# Patient Record
Sex: MALE | Race: BLACK OR AFRICAN AMERICAN | NOT HISPANIC OR LATINO | Employment: UNEMPLOYED | URBAN - METROPOLITAN AREA
[De-identification: names, ages, dates, MRNs, and addresses within clinical notes are randomized per-mention and may not be internally consistent; named-entity substitution may affect disease eponyms.]

---

## 2017-04-21 ENCOUNTER — HOSPITAL ENCOUNTER (EMERGENCY)
Facility: HOSPITAL | Age: 37
Discharge: ANOTHER HOSPITAL | End: 2017-04-21
Attending: EMERGENCY MEDICINE
Payer: MEDICAID

## 2017-04-21 VITALS
OXYGEN SATURATION: 98 % | RESPIRATION RATE: 20 BRPM | HEART RATE: 90 BPM | SYSTOLIC BLOOD PRESSURE: 134 MMHG | WEIGHT: 152 LBS | TEMPERATURE: 99 F | HEIGHT: 71 IN | BODY MASS INDEX: 21.28 KG/M2 | DIASTOLIC BLOOD PRESSURE: 70 MMHG

## 2017-04-21 DIAGNOSIS — S02.601B OPEN FRACTURE OF RIGHT SIDE OF MANDIBULAR BODY, INITIAL ENCOUNTER: Primary | ICD-10-CM

## 2017-04-21 DIAGNOSIS — R93.89 ABNORMAL X-RAY: ICD-10-CM
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
## 2017-04-21 PROCEDURE — 99285 EMERGENCY DEPT VISIT HI MDM: CPT | Mod: 25

## 2017-04-21 PROCEDURE — 25000003 PHARM REV CODE 250: Performed by: EMERGENCY MEDICINE

## 2017-04-21 PROCEDURE — 96365 THER/PROPH/DIAG IV INF INIT: CPT

## 2017-04-21 RX ORDER — CLINDAMYCIN PHOSPHATE 900 MG/50ML
900 INJECTION, SOLUTION INTRAVENOUS
Status: COMPLETED | OUTPATIENT
Start: 2017-04-21 | End: 2017-04-21

## 2017-04-21 RX ADMIN — CLINDAMYCIN IN 5 PERCENT DEXTROSE 900 MG: 18 INJECTION, SOLUTION INTRAVENOUS at 01:04
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
## 2018-01-12 ENCOUNTER — HOSPITAL ENCOUNTER (EMERGENCY)
Facility: HOSPITAL | Age: 38
Discharge: HOME | End: 2018-01-12
Attending: EMERGENCY MEDICINE
Payer: MEDICAID

## 2018-01-12 VITALS
OXYGEN SATURATION: 95 % | HEIGHT: 71 IN | HEART RATE: 65 BPM | RESPIRATION RATE: 16 BRPM | BODY MASS INDEX: 23.1 KG/M2 | WEIGHT: 165 LBS | TEMPERATURE: 99 F | SYSTOLIC BLOOD PRESSURE: 143 MMHG | DIASTOLIC BLOOD PRESSURE: 97 MMHG

## 2018-01-12 DIAGNOSIS — K40.90 RIGHT INGUINAL HERNIA: Primary | ICD-10-CM

## 2018-01-12 PROCEDURE — 99284 EMERGENCY DEPT VISIT MOD MDM: CPT

## 2018-01-12 PROCEDURE — 25000003 PHARM REV CODE 250: Performed by: NURSE PRACTITIONER

## 2018-01-12 RX ORDER — HYDROCODONE BITARTRATE AND ACETAMINOPHEN 5; 325 MG/1; MG/1
1 TABLET ORAL
Status: COMPLETED | OUTPATIENT
Start: 2018-01-12 | End: 2018-01-12

## 2018-01-12 RX ADMIN — HYDROCODONE BITARTRATE AND ACETAMINOPHEN 1 TABLET: 5; 325 TABLET ORAL at 06:01

## 2018-01-13 NOTE — ED PROVIDER NOTES
"Encounter Date: 1/12/2018       History     Chief Complaint   Patient presents with    Abdominal Pain     "I have a hernia. It popped out last night and went to the hospital. They pushed it back in and told me to come back if it happened again."      Chief complaint: Abdominal pain    History of present illness: Patient is a 37-year-old male who reports a right inguinal hernia for which she was seen last night at [facility].  He states he was given a day for surgery at the end of the month and tramadol for pain control which she has not yet filled.  He reports present for 6 months and is intermittently coming out has as of now still been reducible.  He reports that increased pressure causes discomfort.  He reports the pain is 10 over 10.  He denies abdominal pain or difficulty urinating.      The history is provided by the patient and a friend. No  was used.     Review of patient's allergies indicates:  No Known Allergies  History reviewed. No pertinent past medical history.  History reviewed. No pertinent surgical history.  History reviewed. No pertinent family history.  Social History   Substance Use Topics    Smoking status: Current Every Day Smoker     Types: Cigarettes    Smokeless tobacco: Never Used    Alcohol use 2.4 oz/week     4 Cans of beer per week     Review of Systems   Constitutional: Negative for appetite change, chills, diaphoresis, fatigue and fever.   HENT: Negative for congestion, ear discharge, ear pain, postnasal drip, rhinorrhea, sinus pressure, sneezing, sore throat and voice change.    Eyes: Negative for discharge, itching and visual disturbance.   Respiratory: Negative for cough, shortness of breath and wheezing.    Cardiovascular: Negative for chest pain, palpitations and leg swelling.   Gastrointestinal: Negative for abdominal pain, nausea and vomiting.   Endocrine: Negative for polydipsia, polyphagia and polyuria.   Genitourinary: Negative for difficulty " urinating, discharge, dysuria, frequency, hematuria, penile pain, penile swelling and urgency.        (+) Right groin hernia   Musculoskeletal: Negative for arthralgias and myalgias.   Skin: Negative for rash and wound.   Neurological: Negative for dizziness, seizures, syncope and weakness.   Hematological: Negative for adenopathy. Does not bruise/bleed easily.   Psychiatric/Behavioral: Negative for agitation and self-injury. The patient is not nervous/anxious.        Physical Exam     Initial Vitals [01/12/18 1659]   BP Pulse Resp Temp SpO2   (!) 145/73 (!) 58 16 98.1 °F (36.7 °C) 100 %      MAP       97         Physical Exam    Nursing note and vitals reviewed.  Constitutional: He appears well-developed and well-nourished. He is not diaphoretic. No distress.   HENT:   Head: Normocephalic and atraumatic.   Right Ear: External ear normal.   Left Ear: External ear normal.   Nose: Nose normal.   Eyes: Pupils are equal, round, and reactive to light. Right eye exhibits no discharge. Left eye exhibits no discharge. No scleral icterus.   Neck: Normal range of motion.   Pulmonary/Chest: No respiratory distress.   Abdominal: He exhibits no distension. A hernia is present. Hernia confirmed positive in the right inguinal area.   Musculoskeletal: Normal range of motion.   Neurological: He is alert and oriented to person, place, and time.   Skin: Skin is dry. Capillary refill takes less than 2 seconds.         ED Course   Procedures  Labs Reviewed - No data to display          Medical Decision Making:   Physical Exam shows a non-toxic, afebrile, and well appearing male who c/o right inguinal hernia.  Examination of the area demonstrates a nonpulsatile mass of the right inguinal area that reduces easily.     Vital Signs Are Reassuring. If available, previous records reviewed.     My overall impression is right inguinal hernia. I considered, but at this time, do not suspect STD, adenopathy, left inguinal hernia.    ED Course:  norco. D/C Meds: pt should fill tramadol already prescribed. Pt should also use an inguinal supporter. The diagnosis, treatment plan, instructions for follow-up and reevaluation with general surgery as well as ED return precautions were discussed and understanding was verbalized. All questions or concerns have been addressed.     This case was discussed with Dr. Newby who is in agreement with my assessment and plan.                      ED Course      Clinical Impression:   The encounter diagnosis was Right inguinal hernia.    Disposition:   Disposition: Discharged  Condition: Stable                        Lm Fagan DNP  01/12/18 1913

## 2018-01-13 NOTE — DISCHARGE INSTRUCTIONS
Fill and take tramadol as ordered for pain. Go to medical supply store for inguinal hernia support and wear it.  If hernia becomes unable to be reduced return to emergency department.

## 2019-10-15 PROCEDURE — 99284 EMERGENCY DEPT VISIT MOD MDM: CPT | Mod: 25

## 2019-10-15 PROCEDURE — 96372 THER/PROPH/DIAG INJ SC/IM: CPT

## 2019-10-15 PROCEDURE — 12006 RPR S/N/A/GEN/TRK20.1-30.0CM: CPT

## 2019-10-16 ENCOUNTER — HOSPITAL ENCOUNTER (EMERGENCY)
Facility: HOSPITAL | Age: 39
Discharge: HOME | End: 2019-10-16
Attending: EMERGENCY MEDICINE

## 2019-10-16 VITALS
HEIGHT: 71 IN | BODY MASS INDEX: 23.1 KG/M2 | WEIGHT: 165 LBS | OXYGEN SATURATION: 99 % | SYSTOLIC BLOOD PRESSURE: 132 MMHG | TEMPERATURE: 99 F | DIASTOLIC BLOOD PRESSURE: 80 MMHG | HEART RATE: 82 BPM | RESPIRATION RATE: 20 BRPM

## 2019-10-16 DIAGNOSIS — S41.119A: Primary | ICD-10-CM

## 2019-10-16 PROCEDURE — 90715 TDAP VACCINE 7 YRS/> IM: CPT | Mod: SL | Performed by: PHYSICIAN ASSISTANT

## 2019-10-16 PROCEDURE — 90471 IMMUNIZATION ADMIN: CPT | Mod: VFC | Performed by: PHYSICIAN ASSISTANT

## 2019-10-16 PROCEDURE — 63600175 PHARM REV CODE 636 W HCPCS: Performed by: PHYSICIAN ASSISTANT

## 2019-10-16 PROCEDURE — 25000003 PHARM REV CODE 250: Performed by: PHYSICIAN ASSISTANT

## 2019-10-16 RX ORDER — CEFAZOLIN SODIUM 1 G/3ML
1 INJECTION, POWDER, FOR SOLUTION INTRAMUSCULAR; INTRAVENOUS
Status: COMPLETED | OUTPATIENT
Start: 2019-10-16 | End: 2019-10-16

## 2019-10-16 RX ORDER — OXYCODONE AND ACETAMINOPHEN 5; 325 MG/1; MG/1
1 TABLET ORAL EVERY 6 HOURS PRN
Qty: 8 TABLET | Refills: 0 | Status: SHIPPED | OUTPATIENT
Start: 2019-10-16

## 2019-10-16 RX ORDER — CEPHALEXIN 500 MG/1
500 CAPSULE ORAL 4 TIMES DAILY
Qty: 28 CAPSULE | Refills: 0 | Status: SHIPPED | OUTPATIENT
Start: 2019-10-16 | End: 2019-10-23

## 2019-10-16 RX ORDER — BACITRACIN ZINC 500 UNIT/G
OINTMENT (GRAM) TOPICAL 2 TIMES DAILY
Qty: 14 G | Refills: 0 | Status: SHIPPED | OUTPATIENT
Start: 2019-10-16

## 2019-10-16 RX ORDER — IBUPROFEN 600 MG/1
600 TABLET ORAL EVERY 6 HOURS PRN
Qty: 20 TABLET | Refills: 0 | Status: SHIPPED | OUTPATIENT
Start: 2019-10-16

## 2019-10-16 RX ORDER — LIDOCAINE HYDROCHLORIDE 10 MG/ML
10 INJECTION INFILTRATION; PERINEURAL
Status: COMPLETED | OUTPATIENT
Start: 2019-10-16 | End: 2019-10-16

## 2019-10-16 RX ADMIN — CEFAZOLIN 1 G: 1 INJECTION, POWDER, FOR SOLUTION INTRAVENOUS at 01:10

## 2019-10-16 RX ADMIN — LIDOCAINE HYDROCHLORIDE 10 ML: 10 INJECTION, SOLUTION INFILTRATION; PERINEURAL at 01:10

## 2019-10-16 RX ADMIN — CLOSTRIDIUM TETANI TOXOID ANTIGEN (FORMALDEHYDE INACTIVATED), CORYNEBACTERIUM DIPHTHERIAE TOXOID ANTIGEN (FORMALDEHYDE INACTIVATED), BORDETELLA PERTUSSIS TOXOID ANTIGEN (GLUTARALDEHYDE INACTIVATED), BORDETELLA PERTUSSIS FILAMENTOUS HEMAGGLUTININ ANTIGEN (FORMALDEHYDE INACTIVATED), BORDETELLA PERTUSSIS PERTACTIN ANTIGEN, AND BORDETELLA PERTUSSIS FIMBRIAE 2/3 ANTIGEN 0.5 ML: 5; 2; 2.5; 5; 3; 5 INJECTION, SUSPENSION INTRAMUSCULAR at 12:10

## 2019-10-16 RX ADMIN — BACITRACIN, NEOMYCIN, POLYMYXIN B 1 EACH: 400; 3.5; 5 OINTMENT TOPICAL at 01:10

## 2019-10-16 NOTE — DISCHARGE INSTRUCTIONS
Keep dressing in place for 24hrs. After that, change dressings twice daily; apply antibiotic ointment with each dressing change. Keep wounds covered. If any breakthrough bleeding, apply pressure, elevate arm. Take all antibiotics as prescribed; try to take with meals to limit nausea. Take entire course of antibiotics. Sutures need to be removed in 12-14 days; return to this ED for suture removal if you are unable to follow-up with a primary care provider.     Follow-up and establish care with a primary care provider for wound reevaluation. Immediately return to this ED if wounds become red and warm, if wounds begin to drain foul-smelling fluid, if you begin with fever or worsening pain, if any other problems occur.

## 2019-10-16 NOTE — ED NOTES
Pt resting calm in bed no active bleeding noted, no complaints of loss of sensation to wounds with dressings applied
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
## 2019-10-16 NOTE — ED NOTES
All wounds dressed and sutured with neomycin to affected areas with dressings and ace wraps applied to keep dressings intact

## 2021-11-16 ENCOUNTER — LAB VISIT (OUTPATIENT)
Dept: PRIMARY CARE CLINIC | Facility: OTHER | Age: 41
End: 2021-11-16
Payer: OTHER GOVERNMENT

## 2021-11-16 DIAGNOSIS — Z20.822 ENCOUNTER FOR LABORATORY TESTING FOR COVID-19 VIRUS: ICD-10-CM

## 2021-11-16 PROCEDURE — U0003 INFECTIOUS AGENT DETECTION BY NUCLEIC ACID (DNA OR RNA); SEVERE ACUTE RESPIRATORY SYNDROME CORONAVIRUS 2 (SARS-COV-2) (CORONAVIRUS DISEASE [COVID-19]), AMPLIFIED PROBE TECHNIQUE, MAKING USE OF HIGH THROUGHPUT TECHNOLOGIES AS DESCRIBED BY CMS-2020-01-R: HCPCS | Performed by: INTERNAL MEDICINE

## 2021-11-17 LAB
SARS-COV-2 RNA RESP QL NAA+PROBE: NOT DETECTED
SARS-COV-2- CYCLE NUMBER: NORMAL

## 2022-03-29 ENCOUNTER — LAB VISIT (OUTPATIENT)
Dept: PRIMARY CARE CLINIC | Facility: OTHER | Age: 42
End: 2022-03-29
Payer: MEDICAID

## 2022-03-29 DIAGNOSIS — Z20.822 ENCOUNTER FOR LABORATORY TESTING FOR COVID-19 VIRUS: ICD-10-CM

## 2022-03-29 PROCEDURE — U0003 INFECTIOUS AGENT DETECTION BY NUCLEIC ACID (DNA OR RNA); SEVERE ACUTE RESPIRATORY SYNDROME CORONAVIRUS 2 (SARS-COV-2) (CORONAVIRUS DISEASE [COVID-19]), AMPLIFIED PROBE TECHNIQUE, MAKING USE OF HIGH THROUGHPUT TECHNOLOGIES AS DESCRIBED BY CMS-2020-01-R: HCPCS | Performed by: INTERNAL MEDICINE

## 2022-03-30 LAB
SARS-COV-2 RNA RESP QL NAA+PROBE: NOT DETECTED
SARS-COV-2- CYCLE NUMBER: NORMAL

## 2022-07-14 ENCOUNTER — HOSPITAL ENCOUNTER (EMERGENCY)
Facility: HOSPITAL | Age: 42
Discharge: HOME | End: 2022-07-14
Attending: STUDENT IN AN ORGANIZED HEALTH CARE EDUCATION/TRAINING PROGRAM
Payer: MEDICAID

## 2022-07-14 VITALS
SYSTOLIC BLOOD PRESSURE: 121 MMHG | OXYGEN SATURATION: 99 % | DIASTOLIC BLOOD PRESSURE: 78 MMHG | WEIGHT: 165 LBS | BODY MASS INDEX: 23.1 KG/M2 | HEART RATE: 91 BPM | HEIGHT: 71 IN | RESPIRATION RATE: 18 BRPM | TEMPERATURE: 99 F

## 2022-07-14 DIAGNOSIS — T40.601A OPIATE OVERDOSE, ACCIDENTAL OR UNINTENTIONAL, INITIAL ENCOUNTER: ICD-10-CM

## 2022-07-14 DIAGNOSIS — T50.901A ACCIDENTAL DRUG OVERDOSE, INITIAL ENCOUNTER: Primary | ICD-10-CM

## 2022-07-14 PROCEDURE — 99283 EMERGENCY DEPT VISIT LOW MDM: CPT

## 2022-07-14 RX ORDER — NALOXONE HYDROCHLORIDE 4 MG/.1ML
SPRAY NASAL
Qty: 1 EACH | Refills: 11 | Status: SHIPPED | OUTPATIENT
Start: 2022-07-14

## 2022-07-14 NOTE — ED PROVIDER NOTES
"Encounter Date: 7/14/2022    SCRIBE #1 NOTE: I, Conner Dos Santos, am scribing for, and in the presence of,  Robert Arenas MD. I have scribed the following portions of the note - Other sections scribed: HPI, ROS, PE.       History     Chief Complaint   Patient presents with    Drug Overdose     Pt here via EMS with reports of drug overdose. Pt found unresponsive with agonal resp, and pin point pupils. PD gave 4mg IN prior to EMS arrival with no response. EMS gave 2mg IV with positive response.      Jorge Sellers is a 41 y. o male with no pertinent PMHx, that comes to the ED via EMS for possible drug overdose beginning tonight. Patient was found unresponsive with agonal respiration and pinpoint pupils by EMS personnel. Patient was given 4 mg IN prior to EMS arrival, and 0.5 mg Narcan IV with positive response upon EMS arrival approximately 40 minutes ago. Patient reports he was snorting some opioids and drinking 1 beer today, endorsing "he's fine and wants to leave". No other medications taken PTA. No alleviating or exacerbating factors noted. Denies CP, SOB, abdominal pain, N/V/D, or fever. Patient states this has never happened to him before. No known allergies.    The history is provided by the EMS personnel and the patient. No  was used.     Review of patient's allergies indicates:  No Known Allergies  No past medical history on file.  No past surgical history on file.  No family history on file.  Social History     Tobacco Use    Smoking status: Current Every Day Smoker     Types: Cigarettes    Smokeless tobacco: Never Used   Substance Use Topics    Alcohol use: Yes     Alcohol/week: 4.0 standard drinks     Types: 4 Cans of beer per week    Drug use: No     Review of Systems   Constitutional: Negative for chills and fever.   HENT: Negative for facial swelling and sore throat.    Eyes: Negative for visual disturbance.   Respiratory: Negative for cough and shortness of breath.  "   Cardiovascular: Negative for chest pain and palpitations.   Gastrointestinal: Negative for abdominal pain, nausea and vomiting.   Genitourinary: Negative for dysuria and hematuria.   Musculoskeletal: Negative for back pain.   Skin: Negative for rash.   Neurological: Negative for weakness and headaches.   Hematological: Does not bruise/bleed easily.   Psychiatric/Behavioral: Negative.        Physical Exam     Initial Vitals [07/14/22 0029]   BP Pulse Resp Temp SpO2   128/81 93 (!) 22 98.1 °F (36.7 °C) 99 %      MAP       --         Physical Exam    Nursing note and vitals reviewed.  Constitutional: He appears well-developed and well-nourished. He is not diaphoretic. No distress.   Steady on his feet.  Smells like EtOH.   HENT:   Head: Normocephalic and atraumatic.   Right Ear: External ear normal.   Left Ear: External ear normal.   Nose: Nose normal.   Eyes: Conjunctivae are normal. No scleral icterus.   Neck: Neck supple. No tracheal deviation present.   Cardiovascular: Normal rate, regular rhythm and normal heart sounds. Exam reveals no gallop and no friction rub.    No murmur heard.  Pulmonary/Chest: Breath sounds normal. No respiratory distress.   Abdominal: Abdomen is soft. Bowel sounds are normal. There is no abdominal tenderness.   Musculoskeletal:      Cervical back: Neck supple.     Neurological: He is alert and oriented to person, place, and time. GCS score is 15. GCS eye subscore is 4. GCS verbal subscore is 5. GCS motor subscore is 6.   Skin: Skin is warm and dry.   Psychiatric: He has a normal mood and affect. Thought content normal. His speech is slurred.         ED Course   Procedures  Labs Reviewed - No data to display       Imaging Results    None          Medications - No data to display  Medical Decision Making:   History:   Old Medical Records: I decided to obtain old medical records.          Scribe Attestation:   Scribe #1: I performed the above scribed service and the documentation accurately  describes the services I performed. I attest to the accuracy of the note.        ED Course as of 07/14/22 0215   Thu Jul 14, 2022   0046 Patient attempting to leave the emergency department.  On my evaluation patient is slurring speech, smells of alcohol..  He is alert and oriented but I do not believe the patient is capable of making a decision to leave this time.  After discussing with him my thoughts he is willing to stay for observational period and is attempting to call to get a ride. [CC]   0134 Patient received Narcan prior to arrival, 40 minutes. [CC]   0212 On re-evaluation patient resting comfortably.  No signs of respiratory distress.  Patient now speaking much more clearly.  He is stable on his feet.  Clinically the patient is sober and ready for discharge. [CC]      ED Course User Index  [CC] Robert Arenas MD             Clinical Impression:   Final diagnoses:  [T50.901A] Accidental drug overdose, initial encounter (Primary)  [T40.601A] Opiate overdose, accidental or unintentional, initial encounter       I, Robert Arenas MD, personally performed the services described in this documentation. All medical record entries made by the scribe were at my direction and in my presence. I have reviewed the chart and agree that the record reflects my personal performance and is accurate and complete.     ED Disposition Condition    Discharge Stable        ED Prescriptions     Medication Sig Dispense Start Date End Date Auth. Provider    naloxone (NARCAN) 4 mg/actuation Spry 4mg by nasal route as needed for opioid overdose; may repeat every 2-3 minutes in alternating nostrils until medical help arrives. Call 911 1 each 7/14/2022  Robert Arenas MD        Follow-up Information     Follow up With Specialties Details Why Contact [facility] Emergency Dept Emergency Medicine Go to  If symptoms worsen [address] [place] [address] [phone]    Your PCP  Schedule an  appointment as soon as possible for a visit              Robert Arenas MD  07/14/22 6646

## 2022-07-14 NOTE — DISCHARGE INSTRUCTIONS
Use the resources given to find help for opioid use disorder.  Return to the ER with any new or worsening symptoms.

## 2022-07-14 NOTE — ED TRIAGE NOTES
Pt here via EMS with reports of drug overdose. Pt found unresponsive with agonal resp, and pin point pupils. PD gave 4mg IN prior to EMS arrival with no response. EMS gave 2mg IV with positive response.